# Patient Record
Sex: FEMALE | NOT HISPANIC OR LATINO | Employment: OTHER | RURAL
[De-identification: names, ages, dates, MRNs, and addresses within clinical notes are randomized per-mention and may not be internally consistent; named-entity substitution may affect disease eponyms.]

---

## 2017-04-20 ENCOUNTER — TRANSCRIBE ORDERS (OUTPATIENT)
Dept: PHYSICAL THERAPY | Facility: HOSPITAL | Age: 77
End: 2017-04-20

## 2017-04-20 DIAGNOSIS — S42.252A CLOSED DISPLACED FRACTURE OF GREATER TUBEROSITY OF LEFT HUMERUS, INITIAL ENCOUNTER: Primary | ICD-10-CM

## 2017-05-04 ENCOUNTER — HOSPITAL ENCOUNTER (OUTPATIENT)
Dept: PHYSICAL THERAPY | Facility: HOSPITAL | Age: 77
Setting detail: THERAPIES SERIES
Discharge: HOME | End: 2017-05-04

## 2017-05-04 DIAGNOSIS — S42.252A CLOSED DISPLACED FRACTURE OF GREATER TUBEROSITY OF LEFT HUMERUS, INITIAL ENCOUNTER: Primary | ICD-10-CM

## 2017-05-04 PROCEDURE — G8985 CARRY GOAL STATUS: HCPCS

## 2017-05-04 PROCEDURE — G8984 CARRY CURRENT STATUS: HCPCS

## 2017-05-04 PROCEDURE — 97161 PT EVAL LOW COMPLEX 20 MIN: CPT

## 2017-05-09 ENCOUNTER — HOSPITAL ENCOUNTER (OUTPATIENT)
Dept: PHYSICAL THERAPY | Facility: HOSPITAL | Age: 77
Setting detail: THERAPIES SERIES
Discharge: HOME | End: 2017-05-09

## 2017-05-09 DIAGNOSIS — S42.252A CLOSED DISPLACED FRACTURE OF GREATER TUBEROSITY OF LEFT HUMERUS, INITIAL ENCOUNTER: Primary | ICD-10-CM

## 2017-05-09 PROCEDURE — 97110 THERAPEUTIC EXERCISES: CPT

## 2017-05-15 ENCOUNTER — HOSPITAL ENCOUNTER (OUTPATIENT)
Dept: PHYSICAL THERAPY | Facility: HOSPITAL | Age: 77
Setting detail: THERAPIES SERIES
Discharge: HOME | End: 2017-05-15

## 2017-05-15 DIAGNOSIS — S42.252A CLOSED DISPLACED FRACTURE OF GREATER TUBEROSITY OF LEFT HUMERUS, INITIAL ENCOUNTER: Primary | ICD-10-CM

## 2017-05-15 PROCEDURE — 97110 THERAPEUTIC EXERCISES: CPT

## 2017-05-16 ENCOUNTER — HOSPITAL ENCOUNTER (OUTPATIENT)
Dept: PHYSICAL THERAPY | Facility: HOSPITAL | Age: 77
Setting detail: THERAPIES SERIES
Discharge: HOME | End: 2017-05-16

## 2017-05-16 DIAGNOSIS — S42.252A CLOSED DISPLACED FRACTURE OF GREATER TUBEROSITY OF LEFT HUMERUS, INITIAL ENCOUNTER: Primary | ICD-10-CM

## 2017-05-16 PROCEDURE — 97110 THERAPEUTIC EXERCISES: CPT

## 2017-05-18 ENCOUNTER — HOSPITAL ENCOUNTER (OUTPATIENT)
Dept: PHYSICAL THERAPY | Facility: HOSPITAL | Age: 77
Setting detail: THERAPIES SERIES
Discharge: HOME | End: 2017-05-18

## 2017-05-18 DIAGNOSIS — S42.252A CLOSED DISPLACED FRACTURE OF GREATER TUBEROSITY OF LEFT HUMERUS, INITIAL ENCOUNTER: Primary | ICD-10-CM

## 2017-05-18 PROCEDURE — 97110 THERAPEUTIC EXERCISES: CPT

## 2017-05-22 ENCOUNTER — HOSPITAL ENCOUNTER (OUTPATIENT)
Dept: PHYSICAL THERAPY | Facility: HOSPITAL | Age: 77
Setting detail: THERAPIES SERIES
Discharge: HOME | End: 2017-05-22

## 2017-05-22 DIAGNOSIS — S42.252A CLOSED DISPLACED FRACTURE OF GREATER TUBEROSITY OF LEFT HUMERUS, INITIAL ENCOUNTER: Primary | ICD-10-CM

## 2017-05-22 PROCEDURE — 97110 THERAPEUTIC EXERCISES: CPT

## 2017-05-23 ENCOUNTER — HOSPITAL ENCOUNTER (OUTPATIENT)
Dept: PHYSICAL THERAPY | Facility: HOSPITAL | Age: 77
Setting detail: THERAPIES SERIES
Discharge: HOME | End: 2017-05-23

## 2017-05-23 DIAGNOSIS — S42.252A CLOSED DISPLACED FRACTURE OF GREATER TUBEROSITY OF LEFT HUMERUS, INITIAL ENCOUNTER: Primary | ICD-10-CM

## 2017-05-23 PROCEDURE — 97110 THERAPEUTIC EXERCISES: CPT

## 2017-05-25 ENCOUNTER — HOSPITAL ENCOUNTER (OUTPATIENT)
Dept: PHYSICAL THERAPY | Facility: HOSPITAL | Age: 77
Setting detail: THERAPIES SERIES
Discharge: HOME | End: 2017-05-25

## 2017-05-25 DIAGNOSIS — S42.252A CLOSED DISPLACED FRACTURE OF GREATER TUBEROSITY OF LEFT HUMERUS, INITIAL ENCOUNTER: Primary | ICD-10-CM

## 2017-05-25 PROCEDURE — 97140 MANUAL THERAPY 1/> REGIONS: CPT

## 2017-05-25 PROCEDURE — 97110 THERAPEUTIC EXERCISES: CPT

## 2017-05-30 ENCOUNTER — HOSPITAL ENCOUNTER (OUTPATIENT)
Dept: PHYSICAL THERAPY | Facility: HOSPITAL | Age: 77
Setting detail: THERAPIES SERIES
Discharge: HOME | End: 2017-05-30

## 2017-05-30 DIAGNOSIS — S42.252A CLOSED DISPLACED FRACTURE OF GREATER TUBEROSITY OF LEFT HUMERUS, INITIAL ENCOUNTER: Primary | ICD-10-CM

## 2017-05-30 PROCEDURE — 97110 THERAPEUTIC EXERCISES: CPT

## 2017-06-01 ENCOUNTER — HOSPITAL ENCOUNTER (OUTPATIENT)
Dept: PHYSICAL THERAPY | Facility: HOSPITAL | Age: 77
Setting detail: THERAPIES SERIES
Discharge: HOME | End: 2017-06-01

## 2017-06-01 DIAGNOSIS — S42.252A CLOSED DISPLACED FRACTURE OF GREATER TUBEROSITY OF LEFT HUMERUS, INITIAL ENCOUNTER: Primary | ICD-10-CM

## 2017-06-01 PROCEDURE — G8986 CARRY D/C STATUS: HCPCS | Performed by: PHYSICAL THERAPIST

## 2017-06-01 PROCEDURE — 97110 THERAPEUTIC EXERCISES: CPT | Performed by: PHYSICAL THERAPIST

## 2017-06-01 PROCEDURE — G8985 CARRY GOAL STATUS: HCPCS | Performed by: PHYSICAL THERAPIST

## 2017-06-01 NOTE — PROGRESS NOTES
Outpatient Physical Therapy Ortho Progress Note  [facility]     Patient Name: Devika Hess  : 1940  MRN: [id number]  Today's Date: 2017    Attendance 10/10 appointments. MD follow up is in 2 weeks.  Visit Date: 2017    Visit Dx:    ICD-10-CM ICD-9-CM   1. Closed displaced fracture of greater tuberosity of left humerus, initial encounter S42.252A 812.03        Past Medical History:   Diagnosis Date   • Artificial lens present     left   • Cortical senile cataract of left eye    • Degenerative joint disease involving multiple joints    • Hiatal hernia    • History of fibrocystic disease of breast    • History of hemorrhoids    • Hyperlipidemia    • Thyroid nodule     benign   • Visual discomfort         Past Surgical History:   Procedure Laterality Date   • CATARACT EXTRACTION  2013    Cataract extraction with intraocular lens implantation of left eye   • CHOLECYSTECTOMY  1979    operative cholangiography. Chronic cholecystitis, probable cholesterolosis   • COLONOSCOPY  1999    Hemorrhoids were present. Rectal outlet bleeding(Uncoded)   • CYSTOSCOPY  1974    Urethritis, chronic asymptomatic.pseudomembranous trigonitis   • EXCISION LESION  1974    excision of tumor, right breast. Cystic breast,   • HEMORRHOIDECTOMY  1987    resection of strips of rectal mucosa. Hemorrhoids, internal & external with prolapse & bleeding. Same, with rectal mucosal prolapse   • PAP SMEAR  2001    normal   • RIB FRACTURE SURGERY  1990    excision of distal portion of left tenth rib & costochondral junction. probably fracture, hairline, right distal 10th rib   • THYROID BIOPSY  1999    Fine needle thyroid biopsy . Did not reveal any evidence of malignanct cells. Evidence of macrophages but no evidence of malignant cells.Benign thyroid nodule, isthmus   • UPPER GASTROINTESTINAL ENDOSCOPY  1994    Hiatal hernia. Schatzki's ring(biopsoed) Successful  esophageal Dilation   • VAGINAL HYSTERECTOMY  1969    with ovary still intact             PT Ortho       06/01/17 1508    Left Shoulder    Flexion AROM Deficit 150  -DD    ABduction AROM Deficit 137  -DD    External Rotation AROM Deficit --   75 in scapular plane  -DD    Internal Rotation ROM Details To the  shoulder blade with reach  -DD    MMT (Manual Muscle Testing)    General MMT Assessment Detail 4+/5 flexion, 4-/5 abd 4/5 ER  -DD      05/30/17 1100    Precautions and Contraindications    Precautions/Limitations no known precautions/limitations  -    Precautions Humeral Fx  -    Posture/Observations    Posture/Observations Comments full PROM noted.   -      User Key  (r) = Recorded By, (t) = Taken By, (c) = Cosigned By    Initials Name Provider Type    TON Valerio, PT Physical Therapist    JAYLA Katz PTA Physical Therapy Assistant                PT Assessment/Plan       06/01/17 1532       PT Assessment    Impairments Muscle strength  -DD     Assessment Comments Patient independent in hep. Desires DC from PT at this time 2 more week to MD Visit  -DD     Please refer to paper survey for additional self-reported information Yes  -DD     Rehab Potential Excellent  -DD     Patient/caregiver participated in establishment of treatment plan and goals Yes  -DD     Patient would benefit from skilled therapy intervention Yes  -DD     PT Plan    PT Plan Comments Patient to continue abd strengthening independent with HEP.  Other goals are met.  -DD       User Key  (r) = Recorded By, (t) = Taken By, (c) = Cosigned By    Initials Name Provider Type    TON Valerio, PT Physical Therapist                Exercises       06/01/17 1508          Subjective Comments    Subjective Comments Pt reports that she is not having any difficulty with her shoulder.  Doing great with her HEP  -      Subjective Pain    Able to rate subjective pain? yes  -JAYLA      Pre-Treatment Pain Level 0  -JAYLA       Post-Treatment Pain Level 0  -KH      Exercise 1    Exercise Name 1 pro 2 level 2 UE strength  -KH      Time (Minutes) 1 10'  -KH      Exercise 2    Exercise Name 2 PRE shoulder flex;scaption  -KH      Equipment 2 Dumbell  -KH      Weights/Plates 2 1  -KH      Sets 2 3  -KH      Reps 2 10  -KH      Exercise 3    Exercise Name 3 No $  -DD      Equipment 3 Theraband  -DD      Resistance 3 Red  -DD      Sets 3 3  -DD      Reps 3 10  -DD      Exercise 4    Exercise Name 4 t band high row, mid row  -DD      Resistance 4 Red  -DD      Sets 4 3  -DD      Reps 4 10  -DD      Exercise 5    Exercise Name 5 wall push ups  -DD      Sets 5 2  -DD      Reps 5 10  -DD      Exercise 6    Exercise Name 6 standing PNF  -DD      Weights/Plates 6 1  -DD      Sets 6 2  -DD      Reps 6 10  -DD        User Key  (r) = Recorded By, (t) = Taken By, (c) = Cosigned By    Initials Name Provider Type     Kiara Valerio, PT Physical Therapist    JAYLA Katz, PTA Physical Therapy Assistant                PT OP Goals       06/01/17 1510       PT Short Term Goals    STG Date to Achieve 05/25/17  -DD     STG 1 Tolerate 45 minute treatment session, no increase in pain.   -DD     STG 1 Progress Met  -DD     STG 2 No cues for seated posture throughout 45 minute treatment session.   -DD     STG 2 Progress Met  -DD     STG 3 AROM FF to 100 or greater.   -DD     STG 3 Progress Met  -DD     STG 4 AROM ABD to 100 or greater.   -DD     STG 4 Progress Met  -DD     STG 5 AROM IR to L1 or greater.   -DD     STG 5 Progress Met  -DD     STG 6 AROM ER to 45 or greater.  (Met 5-15-17)  -DD     Long Term Goals    LTG Date to Achieve 05/25/17  -DD     LTG 1 AROM FF to 130 or greater.   -DD     LTG 1 Progress Met  -DD     LTG 2 AROM ABD to 130 or greater.   -DD     LTG 2 Progress Met  -DD     LTG 3 AROM  IR to T10 or greater.   -DD     LTG 3 Progress Met  -DD     LTG 4 AROM er to 55 or greater.   -DD     LTG 4 Progress Met  -DD     LTG 5 4/5 L shoulder  flexion/ABD or greater.   -DD     LTG 5 Progress Partially Met  -DD     LTG 5 Progress Comments Flexion met, Abd painful to resistance  -DD     LTG 6 4/5 L shoulder ER/IR or greater.  -DD     LTG 6 Progress Met  -DD     LTG 7 QuickDash to 30.0 or less.   -DD     LTG 7 Progress Met  -DD     LTG 7 Progress Comments 0  -DD       User Key  (r) = Recorded By, (t) = Taken By, (c) = Cosigned By    Initials Name Provider Type    TON Valerio, PT Physical Therapist              Outcome Measures       06/01/17 1508          Quick DASH    Open a tight or new jar. 1  -KH      Do heavy household chores (e.g., wash walls, wash floors) 1  -KH      Carry a shopping bag or briefcase 1  -KH      Wash your back 1  -KH      Use a knife to cut food 1  -KH      Recreational activities in which you take some force or impact through your arm, should or hand (e.g. golf, hammering, tennis, etc.) 1  -KH      During the past week, to what extent has your arm, shoulder, or hand problem interfered with your normal social activites with family, friends, neighbors or groups? 1  -KH      During the past week, were you limited in your work or other regular daily activities as a result of your arm, shoulder or hand problem? 1  -KH      Arm, Shoulder, or hand pain 1  -KH      Tingling (pins and needles) in your arm, shoulder, or hand 1  -KH      During the past week, how much difficulty have you had sleeping because of the pain in your arm, shoulder or hand? 1  -KH      Number of Questions Answered 11  -KH      Quick DASH Score 0  -KH      Functional Assessment    Outcome Measure Options Quick DASH  -KH        User Key  (r) = Recorded By, (t) = Taken By, (c) = Cosigned By    Initials Name Provider Type    JAYLA Katz PTA Physical Therapy Assistant        Time Calculation:   Start Time: 1508  Stop Time: 1546  Time Calculation (min): 38 min  Total Timed Code Minutes- PT: 38 minute(s)    Therapy Charges for Today     Code Description  Service Date Service Provider Modifiers Qty    38322426148 HC PT CARRY MOV HAND OBJ PROJECTED 6/1/2017 Kiara Valerio, PT GP, CI 1    16879031647 HC PT CARRY MOV HAND OBJ DISCHARGE 6/1/2017 Kiara Valerio, PT GP, CI 1          PT G-Codes  Outcome Measure Options: Quick DASH  Carrying, Moving and Handling Objects Goal Status (): At least 1 percent but less than 20 percent impaired, limited or restricted  Carrying, Moving and Handling Objects Discharge Status (): At least 1 percent but less than 20 percent impaired, limited or restricted         Kiara Valerio, PT, ATC, DPT  6/1/2017

## 2017-07-20 ENCOUNTER — OFFICE VISIT (OUTPATIENT)
Dept: OTOLARYNGOLOGY | Facility: CLINIC | Age: 77
End: 2017-07-20

## 2017-07-20 VITALS — OXYGEN SATURATION: 96 % | HEART RATE: 68 BPM | WEIGHT: 142 LBS | BODY MASS INDEX: 26.13 KG/M2 | HEIGHT: 62 IN

## 2017-07-20 DIAGNOSIS — C44.91 BCC (BASAL CELL CARCINOMA OF SKIN): Primary | ICD-10-CM

## 2017-07-20 DIAGNOSIS — C44.311 BASAL CELL CARCINOMA OF RIGHT SIDE OF NOSE: Primary | ICD-10-CM

## 2017-07-20 DIAGNOSIS — C44.311 BASAL CELL CARCINOMA OF NASOLABIAL GROOVE: ICD-10-CM

## 2017-07-20 PROCEDURE — 99203 OFFICE O/P NEW LOW 30 MIN: CPT | Performed by: OTOLARYNGOLOGY

## 2017-07-20 RX ORDER — ROSUVASTATIN CALCIUM 20 MG/1
10 TABLET, COATED ORAL NIGHTLY
COMMUNITY
Start: 2017-03-17

## 2017-07-20 RX ORDER — AMLODIPINE BESYLATE 5 MG/1
5 TABLET ORAL
COMMUNITY
Start: 2017-04-11 | End: 2018-04-12

## 2017-07-20 NOTE — PROGRESS NOTES
Subjective   Devika Hess is a 77 y.o. female.   This is a consultation from Dr. Ector Marion  History of Present Illness   Patient reports she has a skin lesion of her nose is been there for about a year.  It is in the same area that her eyeglasses rest so for a while she assumed it was just irritation.  Now however she has undergone a biopsy by Dr. Ector Marion which was consistent with basal cell carcinoma.  She states the lesion still bleeds and crusts.  She's never had any cutaneous carcinomas before.    The following portions of the patient's history were reviewed and updated as appropriate: allergies, current medications, past family history, past medical history, past social history, past surgical history and problem list.      Devika Hess reports that she quit smoking about 38 years ago. She has never used smokeless tobacco. She reports that she does not drink alcohol.  Patient is not a tobacco user and has not been counseled for use of tobacco products    Family History   Problem Relation Age of Onset   • Prostate cancer Father    • Gallbladder disease Sister    • Pancreatitis Sister    • Pancreatitis Son 6         Current Outpatient Prescriptions:   •  amLODIPine (NORVASC) 5 MG tablet, Take 5 mg by mouth., Disp: , Rfl:   •  rosuvastatin (CRESTOR) 20 MG tablet, Take 20 mg by mouth., Disp: , Rfl:       Past Medical History:   Diagnosis Date   • Artificial lens present     left   • Cortical senile cataract of left eye    • Degenerative joint disease involving multiple joints    • Hiatal hernia    • High blood pressure    • History of fibrocystic disease of breast    • History of hemorrhoids    • Hyperlipidemia    • Thyroid nodule     benign   • Visual discomfort          Review of Systems   Constitutional: Negative for fever.   HENT: Positive for hearing loss.    All other systems reviewed and are negative.          Objective   Physical Exam  General: Well-developed well-nourished female  in no acute distress.  Alert and oriented ×3. Head: Normocephalic. Face: Symmetrical strength and appearance. PERRL. EOMI. Voice:Strong. Speech:Fluent  Ears: External ears no deformity, canals no discharge, tympanic membranes intact clear and mobile bilaterally.  Nose: Nares show no discharge mass polyp or purulence.  Boggy mucosa is present.  No gross external deformity.  Septum: Midline  Oral cavity: Lips and gums without lesions.  Tongue and floor of mouth without lesions.  Parotid and submandibular ducts unobstructed.  No mucosal lesions on the buccal mucosa or vestibule of the mouth.  Pharynx: No erythema exudate mass or ulcer  Neck: No lymphadenopathy.  No thyromegaly.  Trachea and larynx midline.  No masses in the parotid or submandibular glands.  Skin: 1.0 x 0.7 cm basaloid lesion of the right nasal dorsum laterally.  This is quite close to the right medial canthus on its lateral edge.        Assessment/Plan   [place] was seen today for skin lesion.    Diagnoses and all orders for this visit:    Basal cell carcinoma of right side of nose        Plan: I explained to the patient that the recommended treatment for basal cell carcinomas complete surgical excision with negative margins.  My concern is that if I attempted to do this the lateral margin would likely involve the lacrimal system and/or potentially significantly distort the medial canthus.  I do not feel that I can resect this locally and I have suggested a consultation with Dr. Tapia in [place].  Patient is agreeable and this of be arranged.    My thanks to Dr. Marion for this consultation

## 2019-04-25 RX ORDER — AMLODIPINE BESYLATE 5 MG/1
5 TABLET ORAL DAILY
COMMUNITY

## 2019-04-25 RX ORDER — LISINOPRIL 20 MG/1
20 TABLET ORAL DAILY
COMMUNITY

## 2019-04-25 RX ORDER — LISINOPRIL 10 MG/1
20 TABLET ORAL DAILY
COMMUNITY
End: 2019-04-25

## 2019-04-30 ENCOUNTER — ANESTHESIA (OUTPATIENT)
Dept: GASTROENTEROLOGY | Facility: HOSPITAL | Age: 79
End: 2019-04-30

## 2019-04-30 ENCOUNTER — ANESTHESIA EVENT (OUTPATIENT)
Dept: GASTROENTEROLOGY | Facility: HOSPITAL | Age: 79
End: 2019-04-30

## 2019-04-30 ENCOUNTER — HOSPITAL ENCOUNTER (OUTPATIENT)
Facility: HOSPITAL | Age: 79
Setting detail: HOSPITAL OUTPATIENT SURGERY
Discharge: HOME | End: 2019-04-30
Attending: INTERNAL MEDICINE | Admitting: INTERNAL MEDICINE

## 2019-04-30 VITALS
BODY MASS INDEX: 27.18 KG/M2 | OXYGEN SATURATION: 99 % | SYSTOLIC BLOOD PRESSURE: 111 MMHG | HEIGHT: 60 IN | HEART RATE: 66 BPM | DIASTOLIC BLOOD PRESSURE: 56 MMHG | TEMPERATURE: 97.4 F | RESPIRATION RATE: 16 BRPM | WEIGHT: 138.45 LBS

## 2019-04-30 PROCEDURE — 25010000002 PROPOFOL 10 MG/ML EMULSION: Performed by: NURSE ANESTHETIST, CERTIFIED REGISTERED

## 2019-04-30 RX ORDER — LIDOCAINE HYDROCHLORIDE 10 MG/ML
INJECTION, SOLUTION INFILTRATION; PERINEURAL AS NEEDED
Status: DISCONTINUED | OUTPATIENT
Start: 2019-04-30 | End: 2019-04-30 | Stop reason: SURG

## 2019-04-30 RX ORDER — DEXTROSE AND SODIUM CHLORIDE 5; .45 G/100ML; G/100ML
30 INJECTION, SOLUTION INTRAVENOUS CONTINUOUS
Status: DISCONTINUED | OUTPATIENT
Start: 2019-04-30 | End: 2019-04-30 | Stop reason: HOSPADM

## 2019-04-30 RX ORDER — PROPOFOL 10 MG/ML
VIAL (ML) INTRAVENOUS AS NEEDED
Status: DISCONTINUED | OUTPATIENT
Start: 2019-04-30 | End: 2019-04-30 | Stop reason: SURG

## 2019-04-30 RX ADMIN — PROPOFOL 20 MG: 10 INJECTION, EMULSION INTRAVENOUS at 09:40

## 2019-04-30 RX ADMIN — PROPOFOL 20 MG: 10 INJECTION, EMULSION INTRAVENOUS at 09:46

## 2019-04-30 RX ADMIN — DEXTROSE AND SODIUM CHLORIDE 30 ML/HR: 5; 450 INJECTION, SOLUTION INTRAVENOUS at 09:06

## 2019-04-30 RX ADMIN — PROPOFOL 80 MG: 10 INJECTION, EMULSION INTRAVENOUS at 09:38

## 2019-04-30 RX ADMIN — PROPOFOL 20 MG: 10 INJECTION, EMULSION INTRAVENOUS at 09:45

## 2019-04-30 RX ADMIN — LIDOCAINE HYDROCHLORIDE 100 MG: 10 INJECTION, SOLUTION INFILTRATION; PERINEURAL at 09:38

## 2019-04-30 RX ADMIN — PROPOFOL 20 MG: 10 INJECTION, EMULSION INTRAVENOUS at 09:42

## 2019-04-30 NOTE — ANESTHESIA POSTPROCEDURE EVALUATION
Patient: Devika Hess    Procedure Summary     Date:  04/30/19 Room / Location:  [facility] ENDOSCOPY 2 / [facility] ENDOSCOPY    Anesthesia Start:  0935 Anesthesia Stop:  0954    Procedure:  COLONOSCOPY (N/A ) Diagnosis:       Colon cancer screening      (Colon cancer screening [Z12.11])    Surgeon:  Jenaro Avila DO Provider:  Juliet Wells CRNA    Anesthesia Type:  MAC ASA Status:  2          Anesthesia Type: MAC  Last vitals  BP   149/74 (04/30/19 0857)   Temp   98.1 °F (36.7 °C) (04/30/19 0857)   Pulse   70 (04/30/19 0857)   Resp   16 (04/30/19 0857)     SpO2   98 % (04/30/19 0857)     Post Anesthesia Care and Evaluation    Patient location during evaluation: bedside  Patient participation: complete - patient participated  Level of consciousness: awake and alert  Pain score: 0  Pain management: satisfactory to patient  Airway patency: patent  Anesthetic complications: No anesthetic complications  PONV Status: none  Cardiovascular status: acceptable and stable  Respiratory status: acceptable, room air, unassisted and spontaneous ventilation  Hydration status: acceptable

## 2019-04-30 NOTE — ANESTHESIA PREPROCEDURE EVALUATION
Anesthesia Evaluation     Patient summary reviewed and Nursing notes reviewed   NPO Solid Status: > 8 hours  NPO Liquid Status: > 4 hours           Airway   Mallampati: II  TM distance: >3 FB  Neck ROM: full  No difficulty expected  Dental    (+) partials        Pulmonary - normal exam   (+) a smoker Former,   Cardiovascular - normal exam    (+) hypertension, hyperlipidemia,       Neuro/Psych  GI/Hepatic/Renal/Endo    (+)  hiatal hernia,      Musculoskeletal         ROS comment: DJD  Abdominal  - normal exam   Substance History - negative use     OB/GYN negative ob/gyn ROS         Other   (+) arthritis                     Anesthesia Plan    ASA 2     MAC     intravenous induction   Anesthetic plan, all risks, benefits, and alternatives have been provided, discussed and informed consent has been obtained with: patient.

## 2019-11-21 ENCOUNTER — HOSPITAL ENCOUNTER (OUTPATIENT)
Dept: CT IMAGING | Facility: HOSPITAL | Age: 79
Discharge: HOME | End: 2019-11-21
Admitting: INTERNAL MEDICINE

## 2019-11-21 DIAGNOSIS — K55.9 ISCHEMIC BOWEL DISEASE (HCC): ICD-10-CM

## 2019-11-21 PROCEDURE — 0 IOPAMIDOL PER 1 ML: Performed by: INTERNAL MEDICINE

## 2019-11-21 PROCEDURE — 74174 CTA ABD&PLVS W/CONTRAST: CPT

## 2019-11-21 RX ADMIN — IOPAMIDOL 90 ML: 755 INJECTION, SOLUTION INTRAVENOUS at 15:42

## 2021-01-22 ENCOUNTER — IMMUNIZATION (OUTPATIENT)
Dept: VACCINE CLINIC | Facility: HOSPITAL | Age: 81
End: 2021-01-22

## 2021-01-22 PROCEDURE — 91300 HC SARSCOV02 VAC 30MCG/0.3ML IM: CPT | Performed by: THORACIC SURGERY (CARDIOTHORACIC VASCULAR SURGERY)

## 2021-01-22 PROCEDURE — 0001A: CPT | Performed by: THORACIC SURGERY (CARDIOTHORACIC VASCULAR SURGERY)

## 2021-02-12 ENCOUNTER — IMMUNIZATION (OUTPATIENT)
Dept: VACCINE CLINIC | Facility: HOSPITAL | Age: 81
End: 2021-02-12

## 2021-02-12 PROCEDURE — 0002A: CPT | Performed by: THORACIC SURGERY (CARDIOTHORACIC VASCULAR SURGERY)

## 2021-02-12 PROCEDURE — 91300 HC SARSCOV02 VAC 30MCG/0.3ML IM: CPT | Performed by: THORACIC SURGERY (CARDIOTHORACIC VASCULAR SURGERY)

## 2021-04-29 ENCOUNTER — PREP FOR SURGERY (OUTPATIENT)
Dept: OTHER | Facility: HOSPITAL | Age: 81
End: 2021-04-29

## 2021-04-29 DIAGNOSIS — N81.4 CYSTOCELE WITH PROLAPSE: Primary | ICD-10-CM

## 2021-04-29 DIAGNOSIS — N39.3 URINARY, INCONTINENCE, STRESS FEMALE: ICD-10-CM

## 2021-04-30 PROBLEM — N81.4 CYSTOCELE WITH PROLAPSE: Status: ACTIVE | Noted: 2021-04-30

## 2021-05-27 ENCOUNTER — PRE-ADMISSION TESTING (OUTPATIENT)
Dept: PREADMISSION TESTING | Facility: HOSPITAL | Age: 81
End: 2021-05-27

## 2021-05-27 VITALS
HEART RATE: 76 BPM | BODY MASS INDEX: 25.76 KG/M2 | HEIGHT: 62 IN | DIASTOLIC BLOOD PRESSURE: 74 MMHG | WEIGHT: 140 LBS | SYSTOLIC BLOOD PRESSURE: 134 MMHG | RESPIRATION RATE: 18 BRPM | OXYGEN SATURATION: 97 %

## 2021-05-27 DIAGNOSIS — N81.4 CYSTOCELE WITH PROLAPSE: ICD-10-CM

## 2021-05-27 LAB
BILIRUB UR QL STRIP: NEGATIVE
CLARITY UR: ABNORMAL
COLOR UR: YELLOW
GLUCOSE UR STRIP-MCNC: NEGATIVE MG/DL
HGB UR QL STRIP.AUTO: ABNORMAL
KETONES UR QL STRIP: NEGATIVE
LEUKOCYTE ESTERASE UR QL STRIP.AUTO: ABNORMAL
NITRITE UR QL STRIP: NEGATIVE
PH UR STRIP.AUTO: 5.5 [PH] (ref 5–9)
PROT UR QL STRIP: NEGATIVE
SP GR UR STRIP: 1.01 (ref 1–1.03)
UROBILINOGEN UR QL STRIP: ABNORMAL

## 2021-05-27 PROCEDURE — 93010 ELECTROCARDIOGRAM REPORT: CPT | Performed by: INTERNAL MEDICINE

## 2021-05-27 PROCEDURE — 81003 URINALYSIS AUTO W/O SCOPE: CPT

## 2021-05-27 PROCEDURE — 93005 ELECTROCARDIOGRAM TRACING: CPT

## 2021-05-27 RX ORDER — SODIUM CHLORIDE, SODIUM GLUCONATE, SODIUM ACETATE, POTASSIUM CHLORIDE AND MAGNESIUM CHLORIDE 526; 502; 368; 37; 30 MG/100ML; MG/100ML; MG/100ML; MG/100ML; MG/100ML
1000 INJECTION, SOLUTION INTRAVENOUS CONTINUOUS PRN
Status: CANCELLED | OUTPATIENT
Start: 2021-06-02

## 2021-05-30 ENCOUNTER — LAB (OUTPATIENT)
Dept: LAB | Facility: HOSPITAL | Age: 81
End: 2021-05-30

## 2021-05-30 DIAGNOSIS — Z01.818 PREOP TESTING: Primary | ICD-10-CM

## 2021-05-30 LAB — SARS-COV-2 N GENE RESP QL NAA+PROBE: NOT DETECTED

## 2021-05-30 PROCEDURE — C9803 HOPD COVID-19 SPEC COLLECT: HCPCS

## 2021-05-30 PROCEDURE — 87635 SARS-COV-2 COVID-19 AMP PRB: CPT

## 2021-06-02 ENCOUNTER — ANESTHESIA (OUTPATIENT)
Dept: PERIOP | Facility: HOSPITAL | Age: 81
End: 2021-06-02

## 2021-06-02 ENCOUNTER — ANESTHESIA EVENT (OUTPATIENT)
Dept: PERIOP | Facility: HOSPITAL | Age: 81
End: 2021-06-02

## 2021-06-02 ENCOUNTER — HOSPITAL ENCOUNTER (OUTPATIENT)
Facility: HOSPITAL | Age: 81
Setting detail: HOSPITAL OUTPATIENT SURGERY
Discharge: HOME | End: 2021-06-02
Attending: UROLOGY | Admitting: UROLOGY

## 2021-06-02 VITALS
SYSTOLIC BLOOD PRESSURE: 133 MMHG | WEIGHT: 138.23 LBS | RESPIRATION RATE: 18 BRPM | HEIGHT: 62 IN | BODY MASS INDEX: 25.44 KG/M2 | HEART RATE: 75 BPM | OXYGEN SATURATION: 100 % | TEMPERATURE: 97.6 F | DIASTOLIC BLOOD PRESSURE: 60 MMHG

## 2021-06-02 DIAGNOSIS — N81.4 CYSTOCELE WITH PROLAPSE: ICD-10-CM

## 2021-06-02 PROCEDURE — 25010000002 CEFTRIAXONE: Performed by: UROLOGY

## 2021-06-02 PROCEDURE — C1781 MESH (IMPLANTABLE): HCPCS | Performed by: UROLOGY

## 2021-06-02 PROCEDURE — C1758 CATHETER, URETERAL: HCPCS | Performed by: UROLOGY

## 2021-06-02 PROCEDURE — 25010000002 ONDANSETRON PER 1 MG: Performed by: NURSE ANESTHETIST, CERTIFIED REGISTERED

## 2021-06-02 PROCEDURE — 25010000002 DEXAMETHASONE PER 1 MG: Performed by: NURSE ANESTHETIST, CERTIFIED REGISTERED

## 2021-06-02 PROCEDURE — 25010000002 EPINEPHRINE 1 MG/ML SOLUTION: Performed by: UROLOGY

## 2021-06-02 PROCEDURE — 25010000002 PROPOFOL 10 MG/ML EMULSION: Performed by: NURSE ANESTHETIST, CERTIFIED REGISTERED

## 2021-06-02 PROCEDURE — 25010000002 GENTAMICIN PER 80 MG: Performed by: UROLOGY

## 2021-06-02 PROCEDURE — 25010000002 HYDROMORPHONE 1 MG/ML SOLUTION: Performed by: NURSE ANESTHETIST, CERTIFIED REGISTERED

## 2021-06-02 PROCEDURE — 25010000002 FENTANYL CITRATE (PF) 50 MCG/ML SOLUTION: Performed by: NURSE ANESTHETIST, CERTIFIED REGISTERED

## 2021-06-02 PROCEDURE — 88302 TISSUE EXAM BY PATHOLOGIST: CPT

## 2021-06-02 PROCEDURE — C1769 GUIDE WIRE: HCPCS | Performed by: UROLOGY

## 2021-06-02 DEVICE — SINGLE INCISION SLING SYSTEM
Type: IMPLANTABLE DEVICE | Site: PELVIS | Status: FUNCTIONAL
Brand: ALTIS

## 2021-06-02 RX ORDER — ACETAMINOPHEN 325 MG/1
650 TABLET ORAL ONCE AS NEEDED
Status: DISCONTINUED | OUTPATIENT
Start: 2021-06-02 | End: 2021-06-02 | Stop reason: HOSPADM

## 2021-06-02 RX ORDER — LEVOFLOXACIN 250 MG/1
250 TABLET ORAL DAILY
Qty: 7 TABLET | Refills: 0 | Status: SHIPPED | OUTPATIENT
Start: 2021-06-02 | End: 2021-06-09

## 2021-06-02 RX ORDER — ONDANSETRON 2 MG/ML
4 INJECTION INTRAMUSCULAR; INTRAVENOUS ONCE
Status: DISCONTINUED | OUTPATIENT
Start: 2021-06-02 | End: 2021-06-02 | Stop reason: HOSPADM

## 2021-06-02 RX ORDER — PROPOFOL 10 MG/ML
VIAL (ML) INTRAVENOUS AS NEEDED
Status: DISCONTINUED | OUTPATIENT
Start: 2021-06-02 | End: 2021-06-02 | Stop reason: SURG

## 2021-06-02 RX ORDER — METRONIDAZOLE 7.5 MG/G
GEL VAGINAL AS NEEDED
Status: DISCONTINUED | OUTPATIENT
Start: 2021-06-02 | End: 2021-06-02 | Stop reason: HOSPADM

## 2021-06-02 RX ORDER — SODIUM CHLORIDE 9 MG/ML
INJECTION, SOLUTION INTRAVENOUS CONTINUOUS PRN
Status: COMPLETED | OUTPATIENT
Start: 2021-06-02 | End: 2021-06-02

## 2021-06-02 RX ORDER — ONDANSETRON 2 MG/ML
INJECTION INTRAMUSCULAR; INTRAVENOUS AS NEEDED
Status: DISCONTINUED | OUTPATIENT
Start: 2021-06-02 | End: 2021-06-02 | Stop reason: SURG

## 2021-06-02 RX ORDER — ACETAMINOPHEN 650 MG/1
650 SUPPOSITORY RECTAL ONCE AS NEEDED
Status: DISCONTINUED | OUTPATIENT
Start: 2021-06-02 | End: 2021-06-02 | Stop reason: HOSPADM

## 2021-06-02 RX ORDER — ONDANSETRON 2 MG/ML
4 INJECTION INTRAMUSCULAR; INTRAVENOUS ONCE AS NEEDED
Status: COMPLETED | OUTPATIENT
Start: 2021-06-02 | End: 2021-06-02

## 2021-06-02 RX ORDER — LIDOCAINE HYDROCHLORIDE 20 MG/ML
INJECTION, SOLUTION INFILTRATION; PERINEURAL AS NEEDED
Status: DISCONTINUED | OUTPATIENT
Start: 2021-06-02 | End: 2021-06-02 | Stop reason: SURG

## 2021-06-02 RX ORDER — GENTAMICIN SULFATE 40 MG/ML
INJECTION, SOLUTION INTRAMUSCULAR; INTRAVENOUS AS NEEDED
Status: DISCONTINUED | OUTPATIENT
Start: 2021-06-02 | End: 2021-06-02 | Stop reason: HOSPADM

## 2021-06-02 RX ORDER — MAGNESIUM HYDROXIDE 1200 MG/15ML
LIQUID ORAL AS NEEDED
Status: DISCONTINUED | OUTPATIENT
Start: 2021-06-02 | End: 2021-06-02 | Stop reason: HOSPADM

## 2021-06-02 RX ORDER — NALOXONE HCL 0.4 MG/ML
0.4 VIAL (ML) INJECTION AS NEEDED
Status: DISCONTINUED | OUTPATIENT
Start: 2021-06-02 | End: 2021-06-02 | Stop reason: HOSPADM

## 2021-06-02 RX ORDER — SODIUM CHLORIDE, SODIUM GLUCONATE, SODIUM ACETATE, POTASSIUM CHLORIDE AND MAGNESIUM CHLORIDE 526; 502; 368; 37; 30 MG/100ML; MG/100ML; MG/100ML; MG/100ML; MG/100ML
1000 INJECTION, SOLUTION INTRAVENOUS CONTINUOUS PRN
Status: DISCONTINUED | OUTPATIENT
Start: 2021-06-02 | End: 2021-06-02 | Stop reason: HOSPADM

## 2021-06-02 RX ORDER — MELOXICAM 7.5 MG/1
7.5 TABLET ORAL DAILY
Qty: 12 TABLET | Refills: 0 | Status: SHIPPED | OUTPATIENT
Start: 2021-06-02 | End: 2021-09-13

## 2021-06-02 RX ORDER — ALBUTEROL SULFATE 2.5 MG/3ML
2.5 SOLUTION RESPIRATORY (INHALATION) ONCE AS NEEDED
Status: DISCONTINUED | OUTPATIENT
Start: 2021-06-02 | End: 2021-06-02 | Stop reason: HOSPADM

## 2021-06-02 RX ORDER — DEXAMETHASONE SODIUM PHOSPHATE 4 MG/ML
INJECTION, SOLUTION INTRA-ARTICULAR; INTRALESIONAL; INTRAMUSCULAR; INTRAVENOUS; SOFT TISSUE AS NEEDED
Status: DISCONTINUED | OUTPATIENT
Start: 2021-06-02 | End: 2021-06-02 | Stop reason: SURG

## 2021-06-02 RX ORDER — FENTANYL CITRATE 50 UG/ML
INJECTION, SOLUTION INTRAMUSCULAR; INTRAVENOUS AS NEEDED
Status: DISCONTINUED | OUTPATIENT
Start: 2021-06-02 | End: 2021-06-02 | Stop reason: SURG

## 2021-06-02 RX ADMIN — DEXAMETHASONE SODIUM PHOSPHATE 4 MG: 4 INJECTION, SOLUTION INTRAMUSCULAR; INTRAVENOUS at 10:09

## 2021-06-02 RX ADMIN — HYDROMORPHONE HYDROCHLORIDE 0.5 MG: 1 INJECTION, SOLUTION INTRAMUSCULAR; INTRAVENOUS; SUBCUTANEOUS at 11:47

## 2021-06-02 RX ADMIN — FENTANYL CITRATE 25 MCG: 50 INJECTION INTRAMUSCULAR; INTRAVENOUS at 10:06

## 2021-06-02 RX ADMIN — FLUORESCEIN SODIUM 2 ML: 100 INJECTION INTRAVENOUS at 10:42

## 2021-06-02 RX ADMIN — SODIUM CHLORIDE, SODIUM GLUCONATE, SODIUM ACETATE, POTASSIUM CHLORIDE AND MAGNESIUM CHLORIDE: 526; 502; 368; 37; 30 INJECTION, SOLUTION INTRAVENOUS at 10:49

## 2021-06-02 RX ADMIN — CEFTRIAXONE 1 G: 1 INJECTION, POWDER, FOR SOLUTION INTRAMUSCULAR; INTRAVENOUS at 10:06

## 2021-06-02 RX ADMIN — FENTANYL CITRATE 25 MCG: 50 INJECTION INTRAMUSCULAR; INTRAVENOUS at 10:25

## 2021-06-02 RX ADMIN — FENTANYL CITRATE 25 MCG: 50 INJECTION INTRAMUSCULAR; INTRAVENOUS at 10:44

## 2021-06-02 RX ADMIN — FENTANYL CITRATE 25 MCG: 50 INJECTION INTRAMUSCULAR; INTRAVENOUS at 10:32

## 2021-06-02 RX ADMIN — ONDANSETRON 4 MG: 2 INJECTION INTRAMUSCULAR; INTRAVENOUS at 13:13

## 2021-06-02 RX ADMIN — ONDANSETRON 4 MG: 2 INJECTION INTRAMUSCULAR; INTRAVENOUS at 10:49

## 2021-06-02 RX ADMIN — SODIUM CHLORIDE, SODIUM GLUCONATE, SODIUM ACETATE, POTASSIUM CHLORIDE AND MAGNESIUM CHLORIDE 1000 ML: 526; 502; 368; 37; 30 INJECTION, SOLUTION INTRAVENOUS at 07:28

## 2021-06-02 RX ADMIN — PROPOFOL 100 MG: 10 INJECTION, EMULSION INTRAVENOUS at 10:02

## 2021-06-02 RX ADMIN — LIDOCAINE HYDROCHLORIDE 30 MG: 20 INJECTION, SOLUTION INFILTRATION; PERINEURAL at 10:02

## 2021-06-02 RX ADMIN — HYDROMORPHONE HYDROCHLORIDE 0.5 MG: 1 INJECTION, SOLUTION INTRAMUSCULAR; INTRAVENOUS; SUBCUTANEOUS at 11:57

## 2021-06-02 NOTE — DISCHARGE INSTR - APPOINTMENTS
*** Call office to schedule follow up with  [phone] wants to see her in the office in one week ***

## 2021-06-02 NOTE — ANESTHESIA PREPROCEDURE EVALUATION
Anesthesia Evaluation     Patient summary reviewed and Nursing notes reviewed   no history of anesthetic complications:  NPO Solid Status: > 8 hours  NPO Liquid Status: > 8 hours           Airway   Mallampati: II  TM distance: >3 FB  Neck ROM: full  possible difficult intubation  Dental    (+) poor dentation    Pulmonary - normal exam    breath sounds clear to auscultation  (+) a smoker Former,   Cardiovascular - normal exam    ECG reviewed  Rhythm: regular  Rate: normal    (+) hypertension, hyperlipidemia,   (-) murmur    ROS comment: Normal sinus rhythm  Nonspecific ST abnormality  Abnormal ECG  No previous ECGs available     Referred By:            Confirmed By:     Specimen Collected: 05/27/21 12:17          Neuro/Psych- negative ROS  GI/Hepatic/Renal/Endo    (+)  hiatal hernia,      Musculoskeletal     Abdominal    Substance History - negative use     OB/GYN negative ob/gyn ROS         Other   arthritis,      ROS/Med Hx Other: Hearing aides out/hard of hearing.                  Anesthesia Plan    ASA 3     general     intravenous induction     Anesthetic plan, all risks, benefits, and alternatives have been provided, discussed and informed consent has been obtained with: patient and child.

## 2021-06-02 NOTE — ANESTHESIA PROCEDURE NOTES
Airway  Urgency: elective    Date/Time: 6/2/2021 10:03 AM  Airway not difficult    General Information and Staff    Patient location during procedure: OR  CRNA: Nimo Valle CRNA    Indications and Patient Condition  Indications for airway management: airway protection    Preoxygenated: yes  Mask difficulty assessment: 0 - not attempted    Final Airway Details  Final airway type: supraglottic airway      Successful airway: I-gel  Size 4    Number of attempts at approach: 1  Assessment: lips, teeth, and gum same as pre-op and atraumatic intubation

## 2021-06-02 NOTE — H&P
[facility] History and Physical      Date of Admission: 6/2/2021      Primary Care Physician: Chris Villanueva MD      Chief Complaint: cystocele    HPI: 81-year-old white female history urinary tract infections had a cystocele for a number years which we have observed now having a lot of pressure and wishes to have it fixed recent cystoscopy really did not find the problem    Concurrent Medical History:  has a past medical history of Artificial lens present, Cortical senile cataract of left eye, Degenerative joint disease involving multiple joints, Hiatal hernia, High blood pressure, History of fibrocystic disease of breast, History of hemorrhoids, History of transfusion, Hyperlipidemia, Thyroid nodule, and Visual discomfort.    Past Surgical History:  has a past surgical history that includes Rib fracture surgery (03/07/1990); Thyroid Biopsy (02/04/1999); Cholecystectomy (05/29/1979); Colonoscopy (12/02/1999); Cystoscopy (04/04/1974); Upper gastrointestinal endoscopy (02/01/1994); Hemorrhoid surgery (06/06/1987); Pap Smear (01/22/2001); Excision Lesion (09/30/1974); Cataract Extraction (03/12/2013); Total vaginal hysterectomy (1969); and Colonoscopy (N/A, 4/30/2019).    Family History: family history includes Gallbladder disease in her sister; Pancreatitis in her sister; Pancreatitis (age of onset: 6) in her son; Prostate cancer in her father.     Social History:  reports that she quit smoking about 42 years ago. She has never used smokeless tobacco. She reports that she does not drink alcohol and does not use drugs.    Allergies:   Allergies   Allergen Reactions   • Codeine    • Demerol [Meperidine] Itching and Nausea And Vomiting   • Lortab [Hydrocodone-Acetaminophen] GI Intolerance       Medications:   Prior to Admission medications    Medication Sig Start Date End Date Taking? Authorizing Provider   amLODIPine (NORVASC) 5 MG tablet Take 5 mg by mouth Daily.   Yes Provider,  MD Sunny   lisinopril (PRINIVIL,ZESTRIL) 20 MG tablet Take 20 mg by mouth Daily.   Yes Provider, MD Sunny   rosuvastatin (CRESTOR) 20 MG tablet Take 10 mg by mouth Every Night. SPLIT 20MG TABLET IN HALF 3/17/17  Yes Provider, MD Sunny       Review of Systems:  Review of Systems   Otherwise complete ROS is negative except as mentioned above.    Physical Exam:   Temp:  [97.8 °F (36.6 °C)] 97.8 °F (36.6 °C)  Heart Rate:  [72] 72  Resp:  [18] 18  BP: (155)/(69) 155/69  Physical Exam head within normal limits    Lungs clear to PNA    Heart normal sinus rhythm without murmurs gallops or friction rub    Abdomen scaphoid    Pelvic cystocele 3+      Results Reviewed:  I have personally reviewed current lab, radiology, and data and agree with results.  Lab Results (last 24 hours)     ** No results found for the last 24 hours. **        Imaging Results (Last 24 Hours)     ** No results found for the last 24 hours. **            Assessment:    Active Hospital Problems    Diagnosis    • **Cystocele with prolapse      Added automatically from request for surgery [id number]               Plan: Cystocele repair benefits been discussed    I discussed the patient's findings and my recommendations with: Risk and benefits of been discussed    This document has been electronically signed by Michael Birmingham MD on June 2, 2021 08:35 CDT

## 2021-06-02 NOTE — OP NOTE
ALTIS PROCEDURE  Procedure Note    Devika Hess  6/2/2021    Pre-op Diagnosis:   Cystocele with prolapse [N81.4]    Post-op Diagnosis:     Post-Op Diagnosis Codes:     * Cystocele with prolapse [N81.4]    Procedure(s):  ALTIS PROCEDURE, CYSTOCELE REPAIR    Surgeon(s):  Michael Birmingham MD    Anesthesia: General    Staff:   Circulator: Mercedes Govea RN  Scrub Person: Anaid Ibarra  Assistant: Gabriela Butcher CSA    Assistant: Gabriela Butcher CSA was responsible for performing the following activities: Irrigation and their skilled assistance was necessary for the success of this case.     Estimated Blood Loss: minimal    Specimens:                ID Type Source Tests Collected by Time   A : Cystocele Tissue Urinary Bladder TISSUE PATHOLOGY EXAM [place], Michael BELLO MD 6/2/2021 1102         Drains: * No LDAs found *    Findings: Cystocele    Complications: None    Indications: Same    Description of Procedure: Patient brought to surgery placed in dorsolithotomy position.  We did sterile prep and drape genitalia routine fashion, then passed a 20 Somali cystoscope in the bladder no tumors infection or calculi noted.  I put right and left 6 Somali ureteral catheters up the ureters.  We went below and then after anchoring a 16 Azevedo catheter we infiltrated the vaginal mucosa 1-100,000 epinephrine made a vertical incision and developed the mucosal planes right and left to the pubocervical fascia the defect for the cystocele was noted we repeated cystoscopy and we had floor seen coming from right and left ureters.  At this point we placed the Altis sling anchors submucosal under the flaps on the right left-hand side into the obturator foramen.  Using the trochars.  We did cystoscopy to make sure we did not perforate the bladder which we did not.  After this we brought the [place] sling across mid urethra and also proximal bladder.  We tightened the tension on this and then elevated the urethra as  well as the bladder neck at this point we excised the excess vaginal wall mucosa and then placed 2 -0 figure-of-eight stitches at the pre- and perifascial defect on the right left of the bladder and then reduce the bladder by tying these to chromic sutures together in the midline reducing the cystocele.  Once is accomplished it should be noted we used 2 stitch sutures to do that and then we placed 3-0 Monocryl x3 figure-of-eight stitches in the peribladder fascial defect and these were tied together to further support the reduced cystocele.  2-0 Vicryl figure-of-eight stitches were used superficially above these to close the defect in like fashion and then we closed the mucosa with interrupted 2-0 Vicryl figure-of-eight stitches.  We did repeat the cystoscopy and had green dye coming from the right and left ureters.  Once is accomplished we replaced the Azevedo.  I placed MetroGel Rosa in the vagina.  Patient taken recovery    Michael Birmingham MD     Date: 6/2/2021  Time: 11:16 CDT

## 2021-06-02 NOTE — ANESTHESIA POSTPROCEDURE EVALUATION
Patient: Devika Hess    Procedure Summary     Date: 06/02/21 Room / Location: [facility] OR 02 / [facility] OR    Anesthesia Start: 0956 Anesthesia Stop: 1123    Procedure: ALTIS PROCEDURE, CYSTOCELE REPAIR (N/A ) Diagnosis:       Cystocele with prolapse      (Cystocele with prolapse [N81.4])    Surgeons: Michael Birmingham MD Provider: David Campos MD    Anesthesia Type: general ASA Status: 3          Anesthesia Type: general    Vitals  No vitals data found for the desired time range.          Post Anesthesia Care and Evaluation    Patient location during evaluation: PACU  Level of consciousness: awake  Pain score: 0  Pain management: adequate  Airway patency: patent  Anesthetic complications: No anesthetic complications  PONV Status: none  Cardiovascular status: acceptable and hemodynamically stable  Respiratory status: acceptable, spontaneous ventilation and room air  Hydration status: acceptable    Comments: ---------------------------               06/02/21                      0727         ---------------------------   BP:          155/69         Pulse:         72           Resp:          18           Temp:   97.8 °F (36.6 °C)   SpO2:         100%         ---------------------------

## 2021-06-07 LAB
LAB AP CASE REPORT: NORMAL
PATH REPORT.FINAL DX SPEC: NORMAL

## 2021-06-18 LAB
QT INTERVAL: 404 MS
QTC INTERVAL: 432 MS

## 2021-09-13 RX ORDER — DOXYCYCLINE HYCLATE 100 MG/1
100 CAPSULE ORAL DAILY PRN
COMMUNITY
End: 2022-03-27

## 2021-09-14 ENCOUNTER — LAB (OUTPATIENT)
Dept: LAB | Facility: HOSPITAL | Age: 81
End: 2021-09-14

## 2021-09-14 DIAGNOSIS — Z01.818 PREOP TESTING: Primary | ICD-10-CM

## 2021-09-14 LAB — SARS-COV-2 N GENE RESP QL NAA+PROBE: NOT DETECTED

## 2021-09-14 PROCEDURE — 87635 SARS-COV-2 COVID-19 AMP PRB: CPT

## 2021-09-14 PROCEDURE — C9803 HOPD COVID-19 SPEC COLLECT: HCPCS

## 2021-09-16 ENCOUNTER — ANESTHESIA EVENT (OUTPATIENT)
Dept: GASTROENTEROLOGY | Facility: HOSPITAL | Age: 81
End: 2021-09-16

## 2021-09-16 ENCOUNTER — HOSPITAL ENCOUNTER (OUTPATIENT)
Facility: HOSPITAL | Age: 81
Setting detail: HOSPITAL OUTPATIENT SURGERY
Discharge: HOME | End: 2021-09-16
Attending: INTERNAL MEDICINE | Admitting: INTERNAL MEDICINE

## 2021-09-16 ENCOUNTER — ANESTHESIA (OUTPATIENT)
Dept: GASTROENTEROLOGY | Facility: HOSPITAL | Age: 81
End: 2021-09-16

## 2021-09-16 VITALS
BODY MASS INDEX: 25.91 KG/M2 | TEMPERATURE: 97.9 F | RESPIRATION RATE: 20 BRPM | HEIGHT: 62 IN | DIASTOLIC BLOOD PRESSURE: 52 MMHG | WEIGHT: 140.8 LBS | HEART RATE: 56 BPM | OXYGEN SATURATION: 98 % | SYSTOLIC BLOOD PRESSURE: 97 MMHG

## 2021-09-16 PROCEDURE — 25010000002 PROPOFOL 10 MG/ML EMULSION: Performed by: NURSE ANESTHETIST, CERTIFIED REGISTERED

## 2021-09-16 PROCEDURE — C1769 GUIDE WIRE: HCPCS | Performed by: INTERNAL MEDICINE

## 2021-09-16 RX ORDER — DEXTROSE AND SODIUM CHLORIDE 5; .45 G/100ML; G/100ML
30 INJECTION, SOLUTION INTRAVENOUS CONTINUOUS PRN
Status: DISCONTINUED | OUTPATIENT
Start: 2021-09-16 | End: 2021-09-16 | Stop reason: HOSPADM

## 2021-09-16 RX ORDER — PROMETHAZINE HYDROCHLORIDE 25 MG/1
25 TABLET ORAL ONCE AS NEEDED
Status: DISCONTINUED | OUTPATIENT
Start: 2021-09-16 | End: 2021-09-16 | Stop reason: HOSPADM

## 2021-09-16 RX ORDER — LIDOCAINE HYDROCHLORIDE 20 MG/ML
INJECTION, SOLUTION INTRAVENOUS AS NEEDED
Status: DISCONTINUED | OUTPATIENT
Start: 2021-09-16 | End: 2021-09-16 | Stop reason: SURG

## 2021-09-16 RX ORDER — ONDANSETRON 2 MG/ML
4 INJECTION INTRAMUSCULAR; INTRAVENOUS ONCE AS NEEDED
Status: DISCONTINUED | OUTPATIENT
Start: 2021-09-16 | End: 2021-09-16 | Stop reason: HOSPADM

## 2021-09-16 RX ORDER — PROMETHAZINE HYDROCHLORIDE 25 MG/1
25 SUPPOSITORY RECTAL ONCE AS NEEDED
Status: DISCONTINUED | OUTPATIENT
Start: 2021-09-16 | End: 2021-09-16 | Stop reason: HOSPADM

## 2021-09-16 RX ORDER — PROPOFOL 10 MG/ML
VIAL (ML) INTRAVENOUS AS NEEDED
Status: DISCONTINUED | OUTPATIENT
Start: 2021-09-16 | End: 2021-09-16 | Stop reason: SURG

## 2021-09-16 RX ADMIN — PROPOFOL 50 MG: 10 INJECTION, EMULSION INTRAVENOUS at 08:36

## 2021-09-16 RX ADMIN — DEXTROSE AND SODIUM CHLORIDE 30 ML/HR: 5; 450 INJECTION, SOLUTION INTRAVENOUS at 07:52

## 2021-09-16 RX ADMIN — LIDOCAINE HYDROCHLORIDE 50 MG: 20 INJECTION, SOLUTION INTRAVENOUS at 08:36

## 2021-09-16 RX ADMIN — PROPOFOL 50 MG: 10 INJECTION, EMULSION INTRAVENOUS at 07:55

## 2021-09-16 NOTE — ANESTHESIA POSTPROCEDURE EVALUATION
Patient: Devika Hess    Procedure Summary     Date: 09/16/21 Room / Location: [facility] ENDOSCOPY 3 / [facility] ENDOSCOPY    Anesthesia Start: 0833 Anesthesia Stop: 0846    Procedure: ESOPHAGOGASTRODUODENOSCOPY (N/A ) Diagnosis:       Stricture of esophagus      (Stricture of esophagus [K22.2])    Surgeons: Jenaro Avila DO Provider: Cathy Carbajal CRNA    Anesthesia Type: MAC ASA Status: 3          Anesthesia Type: MAC    Vitals  No vitals data found for the desired time range.          Post Anesthesia Care and Evaluation    Patient location during evaluation: PHASE II  Patient participation: complete - patient participated  Level of consciousness: awake and alert  Pain score: 0  Pain management: adequate  Airway patency: patent  Anesthetic complications: No anesthetic complications  PONV Status: none  Cardiovascular status: acceptable  Respiratory status: acceptable  Hydration status: acceptable

## 2021-09-16 NOTE — ANESTHESIA PREPROCEDURE EVALUATION
Anesthesia Evaluation     Patient summary reviewed and Nursing notes reviewed   no history of anesthetic complications:  NPO Solid Status: > 8 hours  NPO Liquid Status: > 8 hours           Airway   Mallampati: II  TM distance: >3 FB  Neck ROM: full  possible difficult intubation  Dental    (+) poor dentation    Pulmonary - normal exam    breath sounds clear to auscultation  (+) a smoker Former,   Cardiovascular - normal exam    ECG reviewed  Rhythm: regular  Rate: normal    (+) hypertension, hyperlipidemia,   (-) murmur    ROS comment: Normal sinus rhythm  Nonspecific ST abnormality  Abnormal ECG  No previous ECGs available     Referred By:            Confirmed By:     Specimen Collected: 05/27/21 12:17          Neuro/Psych- negative ROS  GI/Hepatic/Renal/Endo    (+)  hiatal hernia,      Musculoskeletal     Abdominal    Substance History - negative use     OB/GYN negative ob/gyn ROS         Other   arthritis,      ROS/Med Hx Other: Hearing aides out/hard of hearing.                    Anesthesia Plan    ASA 3     MAC     intravenous induction     Anesthetic plan, all risks, benefits, and alternatives have been provided, discussed and informed consent has been obtained with: patient.

## 2021-09-16 NOTE — H&P
Jenaro Antunez DO,[facility]  Gastroenterology  Hepatology  Endoscopy  Board Certified in Internal Medicine and gastroenterology  [address], [address]  [place]. [address]  - [phone]   F - [phone]     GASTROENTEROLOGY HISTORY AND PHYSICAL  NOTE   JENARO ANTUNEZ DO.         SUBJECTIVE:   9/16/2021    Name: Devika Hess  DOD: 1940    Chief Complaint:       Subjective : Dysphagia.  History of esophageal stricture    Patient is 81 y.o. female presents with desire for elective EGD with dilation.      ROS/HISTORY/ CURRENT MEDICATIONS/OBJECTIVE/VS/PE:   Review of Systems:  All systems unremarkable unless specified below.  Constitutional   HENT  Eyes   Respiratory    Cardiovascular  Gastrointestinal   Endocrine  Genitourinary    Musculoskeletal   Skin  Allergic/Immunologic    Neurological    Hematological  Psychiatric/Behavioral    History:     Past Medical History:   Diagnosis Date   • Artificial lens present     left   • Cortical senile cataract of left eye    • Degenerative joint disease involving multiple joints    • Hiatal hernia    • High blood pressure    • History of fibrocystic disease of breast    • History of hemorrhoids    • History of transfusion    • Hyperlipidemia    • PONV (postoperative nausea and vomiting)    • Thyroid nodule     benign   • Visual discomfort      Past Surgical History:   Procedure Laterality Date   • ALTIS PROCEDURE N/A 6/2/2021    Procedure: ALTIS PROCEDURE, CYSTOCELE REPAIR;  Surgeon: Michael Birmingham MD;  Location: [facility];  Service: Urology;  Laterality: N/A;   • CATARACT EXTRACTION Bilateral 03/12/2013    Cataract extraction with intraocular lens implantation of left eye   • CHOLECYSTECTOMY  05/29/1979    operative cholangiography. Chronic cholecystitis, probable cholesterolosis   • COLONOSCOPY  12/02/1999    Hemorrhoids were present. Rectal outlet bleeding(Uncoded)   • COLONOSCOPY N/A 4/30/2019    Procedure: COLONOSCOPY;  Surgeon: Austin  Jenaro KOROMA DO;  Location: [facility] ENDOSCOPY;  Service: Gastroenterology   • CYSTOSCOPY  1974    Urethritis, chronic asymptomatic.pseudomembranous trigonitis   • EXCISION LESION  1974    excision of tumor, right breast. Cystic breast,   • HEMORRHOIDECTOMY  1987    resection of strips of rectal mucosa. Hemorrhoids, internal & external with prolapse & bleeding. Same, with rectal mucosal prolapse   • PAP SMEAR  2001    normal   • RIB FRACTURE SURGERY  1990    excision of distal portion of left tenth rib & costochondral junction. probably fracture, hairline, right distal 10th rib   • THYROID BIOPSY  1999    Fine needle thyroid biopsy . Did not reveal any evidence of malignanct cells. Evidence of macrophages but no evidence of malignant cells.Benign thyroid nodule, isthmus   • UPPER GASTROINTESTINAL ENDOSCOPY  1994    Hiatal hernia. Schatzki's ring(biopsoed) Successful esophageal Dilation   • VAGINAL HYSTERECTOMY      with ovary still intact     Family History   Problem Relation Age of Onset   • Prostate cancer Father    • Gallbladder disease Sister    • Pancreatitis Sister    • Pancreatitis Son 6     Social History     Tobacco Use   • Smoking status: Former Smoker     Quit date:      Years since quittin.7   • Smokeless tobacco: Never Used   Vaping Use   • Vaping Use: Never used   Substance Use Topics   • Alcohol use: No   • Drug use: Never     Prior to Admission medications    Medication Sig Start Date End Date Taking? Authorizing Provider   amLODIPine (NORVASC) 5 MG tablet Take 5 mg by mouth Daily.   Yes ProviderSunny MD   doxycycline (VIBRAMYCIN) 100 MG capsule Take 100 mg by mouth Daily As Needed (bladder infection).   Yes ProviderSunny MD   lisinopril (PRINIVIL,ZESTRIL) 20 MG tablet Take 20 mg by mouth Daily.   Yes ProviderSunny MD   rosuvastatin (CRESTOR) 20 MG tablet Take 10 mg by mouth Every Night. SPLIT 20MG TABLET IN HALF 3/17/17  Yes  Provider, Historical, MD     Allergies:  Codeine, Demerol [meperidine], and Lortab [hydrocodone-acetaminophen]    I have reviewed the patients medical history, surgical history and family history in the available medical record system.     Current Medications:     Current Facility-Administered Medications   Medication Dose Route Frequency Provider Last Rate Last Admin   • dextrose 5 % and sodium chloride 0.45 % infusion  30 mL/hr Intravenous Continuous PRN Jenaro Avila DO 30 mL/hr at 09/16/21 0752 30 mL/hr at 09/16/21 0752       Objective     Physical Exam:   Temp:  [98.8 °F (37.1 °C)] 98.8 °F (37.1 °C)  Heart Rate:  [68] 68  Resp:  [18] 18  BP: (173)/(73) 173/73    Physical Exam:  General Appearance:    Alert, cooperative, in no acute distress   Head:    Normocephalic, without obvious abnormality, atraumatic   Eyes:            Lids and lashes normal, conjunctivae and sclerae normal, no icterus, no pallor, corneas clear, PERRLA   Ears:    Ears appear intact with no abnormalities noted   Throat:   No oral lesions, no thrush, oral mucosa moist   Neck:   No adenopathy, supple, trachea midline, no thyromegaly, no  carotid bruit, no JVD   Back:     No kyphosis present, no scoliosis present, no skin lesions,   erythema or scars, no tenderness to percussion or                 palpation,  range of motion normal   Lungs:     Clear to auscultation,respirations regular, even and         unlabored    Heart:    Regular rhythm and normal rate, normal S1 and S2, no  murmur, no gallop, no rub, no click   Breast Exam:    Deferred   Abdomen:     Normal bowel sounds, no masses, no organomegaly, soft  nontender, nondistended, no guarding, no rebound                 tenderness   Genitalia:    Deferred   Extremities:   Moves all extremities well, no edema, no cyanosis, no          redness   Pulses:   Pulses palpable and equal bilaterally   Skin:   No bleeding, bruising or rash   Lymph nodes:   No palpable adenopathy   Neurologic:    Cranial nerves 2 - 12 grossly intact, sensation intact, DTR     present and equal bilaterally      Results Review:     Lab Results   Component Value Date    WBC 9.7 07/18/2019    HGB 11.7 (L) 07/18/2019    HCT 35.3 (L) 07/18/2019     07/18/2019             No results found for: LIPASE  No results found for: INR  No results found for: CULTURE    Radiology Review:  Imaging Results (Last 72 Hours)     ** No results found for the last 72 hours. **           I reviewed the patient's new clinical results.  I reviewed the patient's new imaging results and agree with the interpretation.     ASSESSMENT/PLAN:   ASSESSMENT:  1.  Esophageal stricture    PLAN:  1.  Esophagogastroduodenoscopy with dilation of the esophagus    Risk and benefits associated with the procedure are reviewed with the patient.  The patient wished to proceed     Jenaro Avila DO  09/16/21  08:02 CDT

## 2022-01-03 ENCOUNTER — OFFICE VISIT (OUTPATIENT)
Dept: OTOLARYNGOLOGY | Facility: CLINIC | Age: 82
End: 2022-01-03

## 2022-01-03 VITALS — OXYGEN SATURATION: 99 % | HEIGHT: 62 IN | WEIGHT: 142.4 LBS | BODY MASS INDEX: 26.2 KG/M2

## 2022-01-03 DIAGNOSIS — K14.0 GLOSSITIS: ICD-10-CM

## 2022-01-03 DIAGNOSIS — K12.30 MUCOSITIS ORAL: Primary | ICD-10-CM

## 2022-01-03 PROCEDURE — 99204 OFFICE O/P NEW MOD 45 MIN: CPT | Performed by: OTOLARYNGOLOGY

## 2022-01-03 RX ORDER — NITROFURANTOIN MACROCRYSTALS 50 MG/1
1 CAPSULE ORAL
COMMUNITY
Start: 2021-11-29 | End: 2022-03-27

## 2022-01-03 RX ORDER — TRIAMCINOLONE ACETONIDE 0.1 %
PASTE (GRAM) DENTAL
Qty: 5 G | Refills: 2 | Status: SHIPPED | OUTPATIENT
Start: 2022-01-03 | End: 2022-03-27

## 2022-01-03 NOTE — PROGRESS NOTES
"Subjective   Devika Hess is a 81 y.o. female.       History of Present Illness   Patient reports she had dental work done earlier this year then around October she began having sore places in her mouth that she describes as \"blisters\".  She was treated with pills and mouth rinse with no improvement.  Took some medicine from Dr. Villanueva that she says helped a little but she cannot remember what it was and does not have the prescription.  Has been using Biotene.      The following portions of the patient's history were reviewed and updated as appropriate: allergies, current medications, past family history, past medical history, past social history, past surgical history and problem list.     reports that she quit smoking about 43 years ago. She has never used smokeless tobacco. She reports that she does not drink alcohol and does not use drugs.   Patient is not a tobacco user and has not been counseled for use of tobacco products      Review of Systems        Objective   Physical Exam  Oral cavity: The areas of concern are mucositis on the vestibule of the mouth on the right upper corresponding to an area of overlap between upper and lower teeth at the area of the canine as well as irritation of the tip of the tongue on both sides of the midline corresponding to an area of irregularity of the incisors that is somewhat sharp.  None of this looks infectious or neoplastic      Assessment/Plan   Diagnoses and all orders for this visit:    1. Mucositis oral (Primary)    2. Glossitis    Other orders  -     triamcinolone (KENALOG) 0.1 % paste; Apply to mouth sores and sore area on tongue twice a day  Dispense: 5 g; Refill: 2      Plan: Will treat with topical Kenalog and Orabase twice a day and reevaluate in a month.  Call sooner for problems.    "

## 2022-03-24 ENCOUNTER — OFFICE VISIT (OUTPATIENT)
Dept: OTOLARYNGOLOGY | Facility: CLINIC | Age: 82
End: 2022-03-24

## 2022-03-24 VITALS — WEIGHT: 143 LBS | HEIGHT: 62 IN | BODY MASS INDEX: 26.31 KG/M2 | OXYGEN SATURATION: 98 %

## 2022-03-24 DIAGNOSIS — K14.0 GLOSSITIS: Primary | ICD-10-CM

## 2022-03-24 PROCEDURE — 99213 OFFICE O/P EST LOW 20 MIN: CPT | Performed by: OTOLARYNGOLOGY

## 2022-03-27 NOTE — PROGRESS NOTES
"Subjective   Devika Hess is a 82 y.o. female.       History of Present Illness   Patient was seen previously with glossitis and oral mucositis both thought to be due to dental irritation.  Was prescribed Kenalog and Orabase.  Reports that the \"blisters\" in her mouth are gone.  She feels like her tongue is \"splitting\" in the midline.  He states her toothpaste seems to make it worse feels like it burns.    The following portions of the patient's history were reviewed and updated as appropriate: allergies, current medications, past family history, past medical history, past social history, past surgical history and problem list.     reports that she quit smoking about 43 years ago. She has never used smokeless tobacco. She reports that she does not drink alcohol and does not use drugs.   Patient is not a tobacco user and has not been counseled for use of tobacco products      Review of Systems        Objective   Physical Exam    Oral cavity: The previous areas of mucositis of the vestibule of the mouth have resolved.  The glossitis has improved.  The area that she feels like is split is actually just the midline raphae without obvious mucosal disruption.        Assessment/Plan   Diagnoses and all orders for this visit:    1. Glossitis (Primary)      Plan: Clinically the areas of concern have improved.  I gave her a couple of suggestions for some nonirritating toothpaste (Toms Natural and Hello SLS free) and told her to call back if she worsened.    "

## 2022-05-20 ENCOUNTER — HOSPITAL ENCOUNTER (EMERGENCY)
Facility: HOSPITAL | Age: 82
Discharge: HOME | End: 2022-05-20
Attending: EMERGENCY MEDICINE | Admitting: EMERGENCY MEDICINE

## 2022-05-20 VITALS
DIASTOLIC BLOOD PRESSURE: 70 MMHG | WEIGHT: 136 LBS | TEMPERATURE: 97.5 F | SYSTOLIC BLOOD PRESSURE: 161 MMHG | HEART RATE: 58 BPM | OXYGEN SATURATION: 98 % | HEIGHT: 62 IN | RESPIRATION RATE: 18 BRPM | BODY MASS INDEX: 25.03 KG/M2

## 2022-05-20 DIAGNOSIS — B02.8 HERPES ZOSTER WITH COMPLICATION: Primary | ICD-10-CM

## 2022-05-20 PROCEDURE — 25010000002 MORPHINE PER 10 MG: Performed by: EMERGENCY MEDICINE

## 2022-05-20 PROCEDURE — 99283 EMERGENCY DEPT VISIT LOW MDM: CPT

## 2022-05-20 PROCEDURE — 96375 TX/PRO/DX INJ NEW DRUG ADDON: CPT

## 2022-05-20 PROCEDURE — 96374 THER/PROPH/DIAG INJ IV PUSH: CPT

## 2022-05-20 PROCEDURE — 25010000002 ONDANSETRON PER 1 MG: Performed by: EMERGENCY MEDICINE

## 2022-05-20 RX ORDER — LIDOCAINE 50 MG/G
2 PATCH TOPICAL EVERY 24 HOURS
Qty: 20 PATCH | Refills: 0 | Status: SHIPPED | OUTPATIENT
Start: 2022-05-20 | End: 2022-05-30

## 2022-05-20 RX ORDER — TRAMADOL HYDROCHLORIDE 50 MG/1
50 TABLET ORAL EVERY 6 HOURS PRN
COMMUNITY

## 2022-05-20 RX ORDER — ONDANSETRON 2 MG/ML
4 INJECTION INTRAMUSCULAR; INTRAVENOUS ONCE
Status: COMPLETED | OUTPATIENT
Start: 2022-05-20 | End: 2022-05-20

## 2022-05-20 RX ORDER — ONDANSETRON 4 MG/1
4 TABLET, FILM COATED ORAL EVERY 8 HOURS PRN
COMMUNITY

## 2022-05-20 RX ORDER — LIDOCAINE 50 MG/G
2 PATCH TOPICAL
Status: DISCONTINUED | OUTPATIENT
Start: 2022-05-20 | End: 2022-05-20 | Stop reason: HOSPADM

## 2022-05-20 RX ADMIN — MORPHINE SULFATE 4 MG: 4 INJECTION INTRAVENOUS at 05:18

## 2022-05-20 RX ADMIN — ONDANSETRON 4 MG: 2 INJECTION INTRAMUSCULAR; INTRAVENOUS at 05:19

## 2022-05-20 RX ADMIN — LIDOCAINE 2 PATCH: 50 PATCH CUTANEOUS at 05:18

## 2022-05-20 NOTE — DISCHARGE INSTRUCTIONS
Continue with tramadol as tolerated.  Take Zofran as needed.  Follow-up with primary care for reevaluation.  Return to ER for any worsening.

## 2022-05-20 NOTE — ED PROVIDER NOTES
Subjective   82-year-old female with history of shingles right flank/back for the last 3 weeks presented with increasing pain in the rash area and is unable to find relief.  She was started on Valtrex which she did not tolerate and her primary care started on tramadol which according to patient she gets nauseated and throws back up.  She is taking Zofran with it which does not help.  Patient reports she took her last dose of tramadol last night around 8 PM and had vomiting twice since then.  No abdominal pain.  Because of pain she was having difficulty breathing.  Denies any chest pain or palpitations.  Patient is very anxious and says she probably have panic attack.  Patient wants something else for pain.      History provided by:  Patient      Review of Systems   Constitutional: Negative for chills and fever.   HENT: Negative for congestion, rhinorrhea, sinus pressure and sneezing.    Respiratory: Positive for shortness of breath. Negative for chest tightness.    Cardiovascular: Negative for chest pain and palpitations.   Gastrointestinal: Positive for nausea and vomiting. Negative for abdominal pain.   Genitourinary: Negative for flank pain.   Musculoskeletal: Positive for back pain.   Skin: Positive for rash. Negative for color change.   Neurological: Negative for syncope and headaches.   Psychiatric/Behavioral: Negative for agitation.       Past Medical History:   Diagnosis Date   • Artificial lens present     left   • Cortical senile cataract of left eye    • Degenerative joint disease involving multiple joints    • Hiatal hernia    • High blood pressure    • History of fibrocystic disease of breast    • History of hemorrhoids    • History of transfusion    • Hyperlipidemia    • PONV (postoperative nausea and vomiting)    • Thyroid nodule     benign   • Visual discomfort        Allergies   Allergen Reactions   • Codeine    • Demerol [Meperidine] Itching and Nausea And Vomiting   • Lortab  [Hydrocodone-Acetaminophen] GI Intolerance   • Metronidazole Itching and Other (See Comments)     Increased B/P       Past Surgical History:   Procedure Laterality Date   • ALTIS PROCEDURE N/A 6/2/2021    Procedure: ALTIS PROCEDURE, CYSTOCELE REPAIR;  Surgeon: Michael Birmingham MD;  Location: [facility] OR;  Service: Urology;  Laterality: N/A;   • CATARACT EXTRACTION Bilateral 03/12/2013    Cataract extraction with intraocular lens implantation of left eye   • CHOLECYSTECTOMY  05/29/1979    operative cholangiography. Chronic cholecystitis, probable cholesterolosis   • COLONOSCOPY  12/02/1999    Hemorrhoids were present. Rectal outlet bleeding(Uncoded)   • COLONOSCOPY N/A 4/30/2019    Procedure: COLONOSCOPY;  Surgeon: Jenaro Avila DO;  Location: [facility] ENDOSCOPY;  Service: Gastroenterology   • CYSTOSCOPY  04/04/1974    Urethritis, chronic asymptomatic.pseudomembranous trigonitis   • ENDOSCOPY N/A 9/16/2021    Procedure: ESOPHAGOGASTRODUODENOSCOPY;  Surgeon: Jenaro Avila DO;  Location: [facility] ENDOSCOPY;  Service: Gastroenterology;  Laterality: N/A;   • EXCISION LESION  09/30/1974    excision of tumor, right breast. Cystic breast,   • HEMORRHOIDECTOMY  06/06/1987    resection of strips of rectal mucosa. Hemorrhoids, internal & external with prolapse & bleeding. Same, with rectal mucosal prolapse   • PAP SMEAR  01/22/2001    normal   • RIB FRACTURE SURGERY  03/07/1990    excision of distal portion of left tenth rib & costochondral junction. probably fracture, hairline, right distal 10th rib   • THYROID BIOPSY  02/04/1999    Fine needle thyroid biopsy . Did not reveal any evidence of malignanct cells. Evidence of macrophages but no evidence of malignant cells.Benign thyroid nodule, isthmus   • UPPER GASTROINTESTINAL ENDOSCOPY  02/01/1994    Hiatal hernia. Schatzki's ring(biopsoed) Successful esophageal Dilation   • VAGINAL HYSTERECTOMY  1969    with ovary still intact       Family History   Problem Relation Age  of Onset   • Prostate cancer Father    • Gallbladder disease Sister    • Pancreatitis Sister    • Pancreatitis Son 6       Social History     Socioeconomic History   • Marital status:    Tobacco Use   • Smoking status: Former Smoker     Quit date:      Years since quittin.4   • Smokeless tobacco: Never Used   Vaping Use   • Vaping Use: Never used   Substance and Sexual Activity   • Alcohol use: No   • Drug use: Never   • Sexual activity: Defer           Objective   Physical Exam  Vitals and nursing note reviewed.   Constitutional:       Appearance: Normal appearance.   HENT:      Head: Normocephalic.      Right Ear: External ear normal.      Left Ear: External ear normal.      Nose: Nose normal.      Mouth/Throat:      Mouth: Mucous membranes are moist.   Eyes:      Extraocular Movements: Extraocular movements intact.      Pupils: Pupils are equal, round, and reactive to light.   Cardiovascular:      Rate and Rhythm: Normal rate and regular rhythm.   Pulmonary:      Effort: Pulmonary effort is normal.   Abdominal:      General: Abdomen is flat.   Musculoskeletal:         General: Normal range of motion.      Cervical back: Normal range of motion.   Skin:     General: Skin is warm.      Capillary Refill: Capillary refill takes less than 2 seconds.      Findings: Lesion and rash present.          Neurological:      General: No focal deficit present.      Mental Status: She is alert and oriented to person, place, and time. Mental status is at baseline.   Psychiatric:         Mood and Affect: Mood normal.         Procedures           ED Course                                                 MDM  Number of Diagnoses or Management Options  Diagnosis management comments: She is given morphine and Zofran and applied lidocaine patches and her pain is improved.  Does not want any narcotic pain medication at home.  I will give her lidocaine patches.  Does not have any chest pain palpitations or shortness of  breath.  Patient was very anxious and frustrated because of pain not getting controlled.  No abdominal tenderness.  Discussed in length with patient and daughter they agreed with lidocaine patches.  Do not think any other work-up and is stable for discharge.      Final diagnoses:   Herpes zoster with complication       ED Disposition  ED Disposition     ED Disposition   Discharge    Condition   Stable    Comment   --             Chris Villanueva MD  [address] [facility] [address] [phone]    Call in 1 day  for re evaluation, even if feeling better    [facility] EMERGENCY DEPARTMENT  [address] [facility] [address] [phone]    As needed, If symptoms worsen         Medication List      New Prescriptions    lidocaine 5 %  Commonly known as: LIDODERM  Place 2 patches on the skin as directed by provider Daily for 10 days. Remove & Discard patch within 12 hours or as directed by MD           Where to Get Your Medications      These medications were sent to [facility], [facility] - [place], [place] - [address] [place] [address] [address]  [phone]  - [phone] FX  [address] [facility] [address] [facility] [address]    Phone: [phone]   · lidocaine 5 %          Dagoberto Gonzalez MD  05/20/22 8030

## (undated) DEVICE — GLV SURG SIGNATURE ESSENTIAL PF LTX SZ7.5

## (undated) DEVICE — SOL NACL 0.9PCT 100ML SGL

## (undated) DEVICE — PK CYSTO LF 60

## (undated) DEVICE — DRAINBAG,ANTI-REFLUX TOWER,L/F,2000ML,LL: Brand: MEDLINE

## (undated) DEVICE — GOWN,NON-REINFORCED,SIRUS,SET IN SLV,XL: Brand: MEDLINE

## (undated) DEVICE — SPNG GZ WOVN 4X4IN 12PLY 10/BX STRL

## (undated) DEVICE — UNDYED BRAIDED (POLYGLACTIN 910), SYNTHETIC ABSORBABLE SUTURE: Brand: COATED VICRYL

## (undated) DEVICE — SOL IRR NACL 0.9PCT 3000ML

## (undated) DEVICE — PENCL ES MEGADINE EZ/CLEAN BUTN W/HOLSTR 10FT

## (undated) DEVICE — CATH URETRL OPN/END 5F70CM

## (undated) DEVICE — SUT VIC 2/0 SH 27IN

## (undated) DEVICE — SUT MNCRYL 3/0 Y936H

## (undated) DEVICE — CONTAINER,SPECIMEN,OR STERILE,4OZ: Brand: MEDLINE

## (undated) DEVICE — STERILE POLYISOPRENE POWDER-FREE SURGICAL GLOVES WITH EMOLLIENT COATING: Brand: PROTEXIS

## (undated) DEVICE — GLV SURG NEOLON 2G PF LF 7.5 STRL

## (undated) DEVICE — GLV SURG SENSICARE POLYISPRN W/ALOE PF LF 6 GRN STRL

## (undated) DEVICE — CATHETER,FOLEY,100%SILICONE,16FR,10ML,LF: Brand: MEDLINE

## (undated) DEVICE — PATIENT RETURN ELECTRODE, SINGLE-USE, CONTACT QUALITY MONITORING, ADULT, WITH 9FT CORD, FOR PATIENTS WEIGING OVER 33LBS. (15KG): Brand: MEGADYNE

## (undated) DEVICE — NDL HYPO PRECISIONGLIDE/REG 18G 1IN PNK

## (undated) DEVICE — PREP SOL POVIDONE/IODINE BT 4OZ

## (undated) DEVICE — BANDAGE,GAUZE,BULKEE II,4.5"X4.1YD,STRL: Brand: MEDLINE

## (undated) DEVICE — SUT GUT CHRM 2/0 UR5 27IN U245H

## (undated) DEVICE — BITEBLOCK ENDO W/STRAP 60F A/ LF DISP

## (undated) DEVICE — SAFESECURE,SECUREMENT,FOLEY CATH,STERILE: Brand: MEDLINE

## (undated) DEVICE — PREP PVP-I 7.5P BT 4OZ

## (undated) DEVICE — TRAY,IRRIGATION,BULBSYRINGE,60ML,CSR,PVP: Brand: MEDLINE

## (undated) DEVICE — RETR STAY HK ELAS BLNT 5MM PK/8

## (undated) DEVICE — CLEANGUIDE DISPOSABLE MARKED SPRING TIP GUIDEWIRE, 1.86 MM X 210 CM: Brand: CLEANGUIDE

## (undated) DEVICE — GLV SURG NEOLON 2G PF LF 6.5 STRL

## (undated) DEVICE — INTENDED FOR TISSUE SEPARATION, AND OTHER PROCEDURES THAT REQUIRE A SHARP SURGICAL BLADE TO PUNCTURE OR CUT.: Brand: BARD-PARKER ® CARBON RIB-BACK BLADES

## (undated) DEVICE — 1314 FOAM STRIP NEEDLE COUNTER: Brand: DEVON

## (undated) DEVICE — SYR LL TP 10ML STRL

## (undated) DEVICE — GW PTFE FIX/CORE FLXTIP .038 3X150CM

## (undated) DEVICE — RETR RNG GEN2 28.6X18.3CM

## (undated) DEVICE — NEEDLE, QUINCKE 22GX3.5": Brand: MEDLINE INDUSTRIES, INC.

## (undated) DEVICE — SOL IRR NACL 0.9PCT BT 1000ML

## (undated) DEVICE — TP SXN YANKR BLB TIP W/TBG 10F LF STRL

## (undated) DEVICE — CANN SMPL SOFTECH BIFLO ETCO2 A/M 7FT

## (undated) DEVICE — DECANT BG O JET

## (undated) DEVICE — LIGHT HANDLE: Brand: DEVON